# Patient Record
Sex: MALE | Race: BLACK OR AFRICAN AMERICAN | NOT HISPANIC OR LATINO | URBAN - METROPOLITAN AREA
[De-identification: names, ages, dates, MRNs, and addresses within clinical notes are randomized per-mention and may not be internally consistent; named-entity substitution may affect disease eponyms.]

---

## 2021-04-26 ENCOUNTER — APPOINTMENT (RX ONLY)
Dept: URBAN - METROPOLITAN AREA CLINIC 154 | Facility: CLINIC | Age: 26
Setting detail: DERMATOLOGY
End: 2021-04-26

## 2021-04-26 DIAGNOSIS — L73.8 OTHER SPECIFIED FOLLICULAR DISORDERS: ICD-10-CM | Status: INADEQUATELY CONTROLLED

## 2021-04-26 PROCEDURE — ? ADDITIONAL NOTES

## 2021-04-26 PROCEDURE — 99203 OFFICE O/P NEW LOW 30 MIN: CPT

## 2021-04-26 PROCEDURE — ? COUNSELING

## 2021-04-26 PROCEDURE — ? FULL BODY SKIN EXAM - DECLINED

## 2021-04-26 PROCEDURE — ? PRESCRIPTION

## 2021-04-26 RX ORDER — CLINDAMYCIN PHOSPHATE 10 MG/ML
SOLUTION TOPICAL
Qty: 1 | Refills: 0 | Status: ERX | COMMUNITY
Start: 2021-04-26

## 2021-04-26 RX ORDER — CLINDAMYCIN PHOSPHATE AND BENZOYL PEROXIDE 12; 50 MG/G; MG/G
GEL TOPICAL
Qty: 1 | Refills: 3 | Status: ERX | COMMUNITY
Start: 2021-04-26

## 2021-04-26 RX ORDER — SULFACETAMIDE SODIUM AND SULFUR 10; 5 MG/G; MG/G
RINSE TOPICAL
Qty: 1 | Refills: 5 | Status: ERX | COMMUNITY
Start: 2021-04-26

## 2021-04-26 RX ADMIN — CLINDAMYCIN PHOSPHATE AND BENZOYL PEROXIDE SMALL AMOUNT: 12; 50 GEL TOPICAL at 00:00

## 2021-04-26 RX ADMIN — CLINDAMYCIN PHOSPHATE SMALL AMOUNT: 10 SOLUTION TOPICAL at 00:00

## 2021-04-26 RX ADMIN — SULFACETAMIDE SODIUM AND SULFUR SMALL AMOUNT: 10; 5 RINSE TOPICAL at 00:00

## 2021-04-26 ASSESSMENT — LOCATION ZONE DERM
LOCATION ZONE: FACE
LOCATION ZONE: NECK

## 2021-04-26 ASSESSMENT — LOCATION DETAILED DESCRIPTION DERM
LOCATION DETAILED: LEFT INFERIOR LATERAL MALAR CHEEK
LOCATION DETAILED: RIGHT SUPERIOR ANTERIOR NECK
LOCATION DETAILED: LEFT SUPERIOR ANTERIOR NECK
LOCATION DETAILED: RIGHT SUPERIOR LATERAL BUCCAL CHEEK
LOCATION DETAILED: RIGHT CHIN

## 2021-04-26 ASSESSMENT — LOCATION SIMPLE DESCRIPTION DERM
LOCATION SIMPLE: LEFT ANTERIOR NECK
LOCATION SIMPLE: LEFT CHEEK
LOCATION SIMPLE: RIGHT ANTERIOR NECK
LOCATION SIMPLE: CHIN
LOCATION SIMPLE: RIGHT CHEEK

## 2021-04-26 NOTE — HPI: NODULE (SMALL BUMP UNDERNEATH SKIN)
How Severe Is Your Nodule?: mild
Is This A New Presentation, Or A Follow-Up?: Nodules
Additional History: Pt states that when he shaves bumps occur. He thinks it could be ingrown hair

## 2021-04-26 NOTE — PROCEDURE: MIPS QUALITY
Quality 226: Preventive Care And Screening: Tobacco Use: Screening And Cessation Intervention: Patient screened for tobacco use, is a smoker AND received Cessation Counseling
Detail Level: Detailed
Quality 431: Preventive Care And Screening: Unhealthy Alcohol Use - Screening: Patient screened for unhealthy alcohol use using a single question and scores less than 2 times per year
Quality 130: Documentation Of Current Medications In The Medical Record: Documentation of a medical reason(s) for not documenting, updating, or reviewing the patient’s current medications list (e.g., patient is in an urgent or emergent medical situation)

## 2022-04-14 ENCOUNTER — APPOINTMENT (RX ONLY)
Dept: URBAN - METROPOLITAN AREA CLINIC 154 | Facility: CLINIC | Age: 27
Setting detail: DERMATOLOGY
End: 2022-04-14